# Patient Record
(demographics unavailable — no encounter records)

---

## 2024-12-16 NOTE — REASON FOR VISIT
[Follow-Up Evaluation] : a follow-up evaluation for [Seizure Disorder] : seizure disorder [Patient] : patient [Mother] : mother [Medical Records] : medical records [[employer] ] : provided by [employer]   [Time Spent: ____ minutes] : Total time spent using  services: [unfilled] minutes. The patient's primary language is not English thus required  services. [Interpreters_IDNumber] : [id number] [TWNoteComboBox1] : Russian

## 2024-12-16 NOTE — PHYSICAL EXAM
[Well-appearing] : well-appearing [Normocephalic] : normocephalic [No dysmorphic facial features] : no dysmorphic facial features [No ocular abnormalities] : no ocular abnormalities [Straight] : straight [No deformities] : no deformities [Alert] : alert [Well related, good eye contact] : well related, good eye contact [Conversant] : conversant [Normal speech and language] : normal speech and language [Follows instructions well] : follows instructions well [Pupils reactive to light and accommodation] : pupils reactive to light and accommodation [Full extraocular movements] : full extraocular movements [Saccadic and smooth pursuits intact] : saccadic and smooth pursuits intact [No nystagmus] : no nystagmus [Normal facial sensation to light touch] : normal facial sensation to light touch [No facial asymmetry or weakness] : no facial asymmetry or weakness [Gross hearing intact] : gross hearing intact [Equal palate elevation] : equal palate elevation [Good shoulder shrug] : good shoulder shrug [Normal tongue movement] : normal tongue movement [Midline tongue, no fasciculations] : midline tongue, no fasciculations [R handed] : R handed [Gets up on table without difficulty] : gets up on table without difficulty [No pronator drift] : no pronator drift [No abnormal involuntary movements] : no abnormal involuntary movements [Able to walk on toes] : able to walk on toes [2+ biceps] : 2+ biceps [Triceps] : triceps [Knee jerks] : knee jerks [Ankle jerks] : ankle jerks [No ankle clonus] : no ankle clonus [Bilaterally] : bilaterally [Localizes LT and temperature] : localizes LT and temperature [No dysmetria on FTNT] : no dysmetria on FTNT [Good walking balance] : good walking balance [Normal gait] : normal gait [Able to tandem well] : able to tandem well [Negative Romberg] : negative Romberg [de-identified] : one hypopigmented lesion on right forearm

## 2024-12-16 NOTE — ASSESSMENT
[FreeTextEntry1] : 18 year old RH teen boy with history of focal to bilateral TC seizures, recently weaned off of LEV after two years seizure freedom in August but had a brief episode of LOC and now back on LEV.

## 2024-12-16 NOTE — CONSULT LETTER
[Dear  ___] : Dear  [unfilled], [Courtesy Letter:] : I had the pleasure of seeing your patient, [unfilled], in my office today. [Please see my note below.] : Please see my note below. [Sincerely,] : Sincerely, [FreeTextEntry3] : Mikayla Davis MD\par  Child Neurologist\par  [address], [address]\[place] [address]\par  Phone: [phone]

## 2024-12-16 NOTE — QUALITY MEASURES
[Seizure frequency] : Seizure frequency: Yes [Etiology, seizure type, and epilepsy syndrome] : Etiology, seizure type, and epilepsy syndrome: Yes [Side effects of anti-seizure medications] : Side effects of anti-seizure medications: Yes [Safety and education around seizures] : Safety and education around seizures: Yes [Issues around driving] : Issues around driving: Yes [Screening for anxiety, depression] : Screening for anxiety, depression: Yes [Treatment-resistant epilepsy (every visit)] : Treatment-resistant epilepsy (every visit): Yes [Adherence to medication(s)] : Adherence to medication(s): Yes [25 Hydroxy Vitamin D level assessed and Vitamin D3 ordered] : 25 Hydroxy Vitamin D level assessed and Vitamin D3 ordered: Yes

## 2024-12-16 NOTE — PLAN
[FreeTextEntry1] : - Discussed last episode could have been syncope vs seizure - Patient would like to continue LEV for now - Increase overall hydration and avoid skipping meals - Will repeat MRI since not done with epilepsy protocol in 2022 (outside study) to help guide medication management - F/u 4 months or earlier as needed
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
## 2025-01-08 NOTE — REASON FOR VISIT
[Follow-Up Evaluation] : a follow-up evaluation for [Seizure Disorder] : seizure disorder [Patient] : patient [Medical Records] : medical records [Family Member] : family member

## 2025-01-08 NOTE — PLAN
[FreeTextEntry1] : Discussed risks and benefits of continuing LEV Since patient is in school and would like to eventually drive and work and LEV is well tolerated, he prefers to continue medication for now Refills sent F/u 6 months or earlier as needed

## 2025-01-08 NOTE — ASSESSMENT
[FreeTextEntry1] : 18 year old RH teen boy with history of focal to bilateral TC seizures, on LEV here for follow up.

## 2025-01-08 NOTE — CONSULT LETTER
[Dear  ___] : Dear  [unfilled], [Courtesy Letter:] : I had the pleasure of seeing your patient, [unfilled], in my office today. [Please see my note below.] : Please see my note below. [Sincerely,] : Sincerely, [FreeTextEntry3] : Mikayla Davis MD\par  Child Neurologist\par  [address], [address]\[place] [address]\par  Phone: [phone]
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
## 2025-01-08 NOTE — PHYSICAL EXAM
[Well-appearing] : well-appearing [Normocephalic] : normocephalic [No dysmorphic facial features] : no dysmorphic facial features [No ocular abnormalities] : no ocular abnormalities [Alert] : alert [Well related, good eye contact] : well related, good eye contact [Conversant] : conversant [Normal speech and language] : normal speech and language [Follows instructions well] : follows instructions well [Pupils reactive to light and accommodation] : pupils reactive to light and accommodation [Full extraocular movements] : full extraocular movements [Saccadic and smooth pursuits intact] : saccadic and smooth pursuits intact [No nystagmus] : no nystagmus [Normal facial sensation to light touch] : normal facial sensation to light touch [No facial asymmetry or weakness] : no facial asymmetry or weakness [Gross hearing intact] : gross hearing intact [Equal palate elevation] : equal palate elevation [Good shoulder shrug] : good shoulder shrug [Normal tongue movement] : normal tongue movement [Midline tongue, no fasciculations] : midline tongue, no fasciculations [R handed] : R handed [Gets up on table without difficulty] : gets up on table without difficulty [No pronator drift] : no pronator drift [No abnormal involuntary movements] : no abnormal involuntary movements [Good walking balance] : good walking balance [Normal gait] : normal gait

## 2025-04-25 NOTE — CONSULT LETTER
[Dear  ___] : Dear  [unfilled], [Courtesy Letter:] : I had the pleasure of seeing your patient, [unfilled], in my office today. [Please see my note below.] : Please see my note below. [Sincerely,] : Sincerely, [FreeTextEntry3] : Mikayla Davis MD\par  Child Neurologist\par  [address], [address]\[place] [address]\par  Phone: [phone]

## 2025-04-25 NOTE — PLAN
[FreeTextEntry1] : Discussed risks and benefits of continuing LEV; episode 8/2024 likely syncope  Reviewed MRI results Will repeat REEG, and if normal discussed he can wean LEV (wean by 500 mg/day qweek until off) Seizure precautions discussed Discussed headache hygiene and keep log to monitor for triggers; limit video games and continue magnesium prn F/u 3-4 months

## 2025-04-25 NOTE — ASSESSMENT
[FreeTextEntry1] : 18 year old RH teen boy with history of focal to bilateral TC seizures (last occurred 3/2022), on LEV here for follow up.
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
## 2025-04-25 NOTE — REASON FOR VISIT
[Follow-Up Evaluation] : a follow-up evaluation for [Seizure Disorder] : seizure disorder [Family Member] : family member [Patient] : patient [Medical Records] : medical records